# Patient Record
Sex: FEMALE | Race: WHITE | URBAN - METROPOLITAN AREA
[De-identification: names, ages, dates, MRNs, and addresses within clinical notes are randomized per-mention and may not be internally consistent; named-entity substitution may affect disease eponyms.]

---

## 2023-11-02 LAB — HBA1C MFR BLD HPLC: 5.3 %

## 2023-12-07 ENCOUNTER — OFFICE VISIT (OUTPATIENT)
Age: 33
End: 2023-12-07
Payer: COMMERCIAL

## 2023-12-07 VITALS
BODY MASS INDEX: 32.72 KG/M2 | HEIGHT: 65 IN | WEIGHT: 196.4 LBS | SYSTOLIC BLOOD PRESSURE: 123 MMHG | HEART RATE: 77 BPM | DIASTOLIC BLOOD PRESSURE: 83 MMHG

## 2023-12-07 DIAGNOSIS — E66.9 CLASS 1 OBESITY: ICD-10-CM

## 2023-12-07 DIAGNOSIS — E28.2 PCOS (POLYCYSTIC OVARIAN SYNDROME): ICD-10-CM

## 2023-12-07 DIAGNOSIS — E03.9 ACQUIRED HYPOTHYROIDISM: ICD-10-CM

## 2023-12-07 DIAGNOSIS — E03.9 ACQUIRED HYPOTHYROIDISM: Primary | ICD-10-CM

## 2023-12-07 DIAGNOSIS — E78.1 HIGH TRIGLYCERIDES: ICD-10-CM

## 2023-12-07 PROBLEM — E66.811 CLASS 1 OBESITY: Status: ACTIVE | Noted: 2023-12-07

## 2023-12-07 PROCEDURE — 99204 OFFICE O/P NEW MOD 45 MIN: CPT | Performed by: INTERNAL MEDICINE

## 2023-12-07 RX ORDER — METFORMIN HYDROCHLORIDE 500 MG/1
TABLET, EXTENDED RELEASE ORAL
Qty: 180 TABLET | Refills: 3 | Status: SHIPPED | OUTPATIENT
Start: 2023-12-07

## 2023-12-07 RX ORDER — LEVOTHYROXINE SODIUM 112 UG/1
112 TABLET ORAL DAILY
COMMUNITY
Start: 2023-11-26

## 2023-12-07 NOTE — PROGRESS NOTES
above         Patient Instructions   1) Sign up for JANZZ using the text link I sent to you and download the JANZZ radu from the radu store (I-phone) or Google play store (android) (make sure you allow locations and choose the [address] [place] [address] portal and choose to receive notifications) so you can communicate with me through the patient portal.  I will send you a message with your lab results. 2) TSH is a thyroid test.  Your level is 1.03 in 11/23 which is normal and at goal of 0.45-2.0. The TSH goes opposite of your thyroid dose and suggests your dose of levothyroxine is perfect so I will keep your dose the same for now. I may consider changing to brand medication in the future if your levels continue to fluctuate. I will check your TPO and thyroglobulin antibodies to screen for Hashimoto's thyroidits. The antibodies will not affect our treatment plan as I will adjust your dose over time based on your TSH and free T4 levels and symptoms and weight. 3) I will test your testosterone level to see if it's in the mid to high part of the normal range or elevated. I think you could have a condition called PCOS or polycystic ovarian syndrome where your body is resistant to insulin leading to high testosterone levels and this can cause irregular periods and weight gain and hair growth. It also increases the risk for diabetes and heart disease in the future. We will try to lower your testosterone with metformin to help treat this condition and prevent diabetes. Begin Metformin  mg with dinner or right after dinner and take for 1-2 weeks. If no GI side effects (nausea, diarrhea, belly pain), go up to 1000 mg = 2 tabs at dinner. You can split the dose to 1 tab twice daily if you would like. If at any point you cannot tolerate the dose, go back to one tab daily. This will be ready for  at the pharmacy today. Stop the insulin resistance supplement from [address].               Return in about 6

## 2023-12-07 NOTE — PATIENT INSTRUCTIONS
1) Sign up for Napartner using the text link I sent to you and download the Napartner radu from the radu store ([employer]-phone) or Google play store (android) (make sure you allow locations and choose the [employer] portal and choose to receive notifications) so you can communicate with me through the patient portal.  I will send you a message with your lab results. 2) TSH is a thyroid test.  Your level is 1.03 in 11/23 which is normal and at goal of 0.45-2.0. The TSH goes opposite of your thyroid dose and suggests your dose of levothyroxine is perfect so I will keep your dose the same for now. I may consider changing to brand medication in the future if your levels continue to fluctuate. I will check your TPO and thyroglobulin antibodies to screen for Hashimoto's thyroidits. The antibodies will not affect our treatment plan as I will adjust your dose over time based on your TSH and free T4 levels and symptoms and weight. 3) I will test your testosterone level to see if it's in the mid to high part of the normal range or elevated. I think you could have a condition called PCOS or polycystic ovarian syndrome where your body is resistant to insulin leading to high testosterone levels and this can cause irregular periods and weight gain and hair growth. It also increases the risk for diabetes and heart disease in the future. We will try to lower your testosterone with metformin to help treat this condition and prevent diabetes. Begin Metformin  mg with dinner or right after dinner and take for 1-2 weeks. If no GI side effects (nausea, diarrhea, belly pain), go up to 1000 mg = 2 tabs at dinner. You can split the dose to 1 tab twice daily if you would like. If at any point you cannot tolerate the dose, go back to one tab daily. This will be ready for  at the pharmacy today. Stop the insulin resistance supplement from [employer]

## 2023-12-08 LAB
T4 FREE SERPL-MCNC: 1 NG/DL (ref 0.8–1.5)
TSH SERPL DL<=0.05 MIU/L-ACNC: 1.87 UIU/ML (ref 0.36–3.74)

## 2023-12-09 LAB — T3 SERPL-MCNC: 106 NG/DL (ref 71–180)

## 2023-12-10 LAB
THYROGLOB AB SERPL-ACNC: 2.9 IU/ML (ref 0–0.9)
THYROPEROXIDASE AB SERPL-ACNC: 14 IU/ML (ref 0–34)

## 2023-12-12 LAB — TESTOST SERPL-MCNC: 21.7 NG/DL (ref 10–55)

## 2024-03-27 ENCOUNTER — OFFICE VISIT (OUTPATIENT)
Facility: CLINIC | Age: 34
End: 2024-03-27
Payer: COMMERCIAL

## 2024-03-27 VITALS
RESPIRATION RATE: 20 BRPM | HEART RATE: 78 BPM | DIASTOLIC BLOOD PRESSURE: 77 MMHG | BODY MASS INDEX: 31.71 KG/M2 | WEIGHT: 202 LBS | TEMPERATURE: 98.1 F | HEIGHT: 67 IN | SYSTOLIC BLOOD PRESSURE: 125 MMHG | OXYGEN SATURATION: 98 %

## 2024-03-27 DIAGNOSIS — R11.0 CHRONIC NAUSEA: ICD-10-CM

## 2024-03-27 DIAGNOSIS — E03.9 ACQUIRED HYPOTHYROIDISM: ICD-10-CM

## 2024-03-27 DIAGNOSIS — F51.01 PRIMARY INSOMNIA: Primary | ICD-10-CM

## 2024-03-27 DIAGNOSIS — E28.2 PCOS (POLYCYSTIC OVARIAN SYNDROME): ICD-10-CM

## 2024-03-27 LAB
ALBUMIN SERPL-MCNC: 3.8 G/DL (ref 3.5–5)
ALBUMIN/GLOB SERPL: 1.3 (ref 1.1–2.2)
ALP SERPL-CCNC: 109 U/L (ref 45–117)
ALT SERPL-CCNC: 36 U/L (ref 12–78)
ANION GAP SERPL CALC-SCNC: 4 MMOL/L (ref 5–15)
AST SERPL-CCNC: 16 U/L (ref 15–37)
BILIRUB SERPL-MCNC: 0.3 MG/DL (ref 0.2–1)
BUN SERPL-MCNC: 13 MG/DL (ref 6–20)
BUN/CREAT SERPL: 22 (ref 12–20)
CALCIUM SERPL-MCNC: 9.1 MG/DL (ref 8.5–10.1)
CHLORIDE SERPL-SCNC: 107 MMOL/L (ref 97–108)
CO2 SERPL-SCNC: 27 MMOL/L (ref 21–32)
CREAT SERPL-MCNC: 0.58 MG/DL (ref 0.55–1.02)
EST. AVERAGE GLUCOSE BLD GHB EST-MCNC: 108 MG/DL
GLOBULIN SER CALC-MCNC: 3 G/DL (ref 2–4)
GLUCOSE SERPL-MCNC: 106 MG/DL (ref 65–100)
HBA1C MFR BLD: 5.4 % (ref 4–5.6)
POTASSIUM SERPL-SCNC: 4.9 MMOL/L (ref 3.5–5.1)
PROT SERPL-MCNC: 6.8 G/DL (ref 6.4–8.2)
SODIUM SERPL-SCNC: 138 MMOL/L (ref 136–145)

## 2024-03-27 PROCEDURE — 99204 OFFICE O/P NEW MOD 45 MIN: CPT | Performed by: FAMILY MEDICINE

## 2024-03-27 RX ORDER — QUETIAPINE FUMARATE 25 MG/1
25 TABLET, FILM COATED ORAL
Qty: 30 TABLET | Refills: 1 | Status: SHIPPED | OUTPATIENT
Start: 2024-03-27

## 2024-03-27 SDOH — ECONOMIC STABILITY: FOOD INSECURITY: WITHIN THE PAST 12 MONTHS, YOU WORRIED THAT YOUR FOOD WOULD RUN OUT BEFORE YOU GOT MONEY TO BUY MORE.: NEVER TRUE

## 2024-03-27 SDOH — ECONOMIC STABILITY: FOOD INSECURITY: WITHIN THE PAST 12 MONTHS, THE FOOD YOU BOUGHT JUST DIDN'T LAST AND YOU DIDN'T HAVE MONEY TO GET MORE.: NEVER TRUE

## 2024-03-27 SDOH — ECONOMIC STABILITY: HOUSING INSECURITY
IN THE LAST 12 MONTHS, WAS THERE A TIME WHEN YOU DID NOT HAVE A STEADY PLACE TO SLEEP OR SLEPT IN A SHELTER (INCLUDING NOW)?: NO

## 2024-03-27 SDOH — ECONOMIC STABILITY: INCOME INSECURITY: HOW HARD IS IT FOR YOU TO PAY FOR THE VERY BASICS LIKE FOOD, HOUSING, MEDICAL CARE, AND HEATING?: NOT HARD AT ALL

## 2024-03-27 ASSESSMENT — PATIENT HEALTH QUESTIONNAIRE - PHQ9
SUM OF ALL RESPONSES TO PHQ QUESTIONS 1-9: 0
1. LITTLE INTEREST OR PLEASURE IN DOING THINGS: NOT AT ALL
SUM OF ALL RESPONSES TO PHQ QUESTIONS 1-9: 0
2. FEELING DOWN, DEPRESSED OR HOPELESS: NOT AT ALL
SUM OF ALL RESPONSES TO PHQ QUESTIONS 1-9: 0
SUM OF ALL RESPONSES TO PHQ9 QUESTIONS 1 & 2: 0
SUM OF ALL RESPONSES TO PHQ QUESTIONS 1-9: 0

## 2024-03-27 NOTE — PROGRESS NOTES
Chief Complaint   Patient presents with    Establish Care    Abdominal Pain    Insomnia     \"Have you been to the ER, urgent care clinic since your last visit?  Hospitalized since your last visit?\"    YES - When: approximately 4 days ago.  Where and Why: [place] [facility] for something in ear.    “Have you seen or consulted any other health care providers outside of [facility] since your last visit?”    NO     “Have you had a pap smear?”    NO    No cervical cancer screening on file             Click Here for Release of Records Request  
in [place] alone.  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  Likes to go to the gym.  Originally from [place] and moved in August 2022 for cost of living and [place]     Social Determinants of Health     Financial Resource Strain: Low Risk  (3/27/2024)    Overall Financial Resource Strain (CARDIA)     Difficulty of Paying Living Expenses: Not hard at all   Food Insecurity: No Food Insecurity (3/27/2024)    Hunger Vital Sign     Worried About Running Out of Food in the Last Year: Never true     Ran Out of Food in the Last Year: Never true   Transportation Needs: Unknown (3/27/2024)    PRAPARE - Transportation     Lack of Transportation (Non-Medical): No   Housing Stability: Unknown (3/27/2024)    Housing Stability Vital Sign     Unstable Housing in the Last Year: No        Review of Systems - Pertinent items are noted in HPI.    Objective:     Vitals:    03/27/24 1131   BP: 125/77   Site: Left Upper Arm   Position: Sitting   Pulse: 78   Resp: 20   Temp: 98.1 °F (36.7 °C)   TempSrc: Oral   SpO2: 98%   Weight: 91.6 kg (202 lb)   Height: 1.702 m (5' 7\")       Physical Examination: General Appearance:  awake, alert, oriented, in no acute distress and overweight  Head/face:  NCAT  Lungs:  Normal expansion.  Clear to auscultation.  No rales, rhonchi, or wheezing.  Heart:  Heart sounds are normal.  Regular rate and rhythm without murmur, gallop or rub.  Abdomen:  Auscultation: Abnormal bowel sounds: diminished  Palpation: No masses, tenderness or organomegally.  Hernias:  none  Psych: Appropriate mood and affect    Assessment/ Plan:   1. Primary insomnia  Comments:  Trial of Seroquel given; increase to 50 mg if needed.  If still not working, will prescribe Belsomra.  Limit OTC sleep aids as may be worsening sleep eating.  Orders:  -     QUEtiapine (SEROQUEL) 25 MG tablet; Take 1 tablet by mouth nightly, Disp-30 tablet, R-1Normal  2. Chronic nausea  Comments:  History of overuse of

## 2024-04-09 DIAGNOSIS — F51.01 PRIMARY INSOMNIA: Primary | ICD-10-CM

## 2024-04-09 RX ORDER — SUVOREXANT 10 MG/1
10 TABLET, FILM COATED ORAL
Qty: 30 TABLET | Refills: 2 | Status: SHIPPED | OUTPATIENT
Start: 2024-04-09 | End: 2024-07-08

## 2024-04-18 DIAGNOSIS — F51.01 PRIMARY INSOMNIA: ICD-10-CM

## 2024-04-18 RX ORDER — QUETIAPINE FUMARATE 25 MG/1
25 TABLET, FILM COATED ORAL NIGHTLY
Qty: 90 TABLET | Refills: 1 | Status: SHIPPED | OUTPATIENT
Start: 2024-04-18

## 2024-05-24 DIAGNOSIS — E03.9 ACQUIRED HYPOTHYROIDISM: ICD-10-CM

## 2024-05-24 DIAGNOSIS — E78.1 HIGH TRIGLYCERIDES: ICD-10-CM

## 2024-05-24 DIAGNOSIS — E66.9 CLASS 1 OBESITY: ICD-10-CM

## 2024-05-24 DIAGNOSIS — E28.2 PCOS (POLYCYSTIC OVARIAN SYNDROME): ICD-10-CM

## 2024-06-07 ENCOUNTER — OFFICE VISIT (OUTPATIENT)
Age: 34
End: 2024-06-07
Payer: COMMERCIAL

## 2024-06-07 VITALS
DIASTOLIC BLOOD PRESSURE: 82 MMHG | HEIGHT: 67 IN | HEART RATE: 87 BPM | WEIGHT: 209.6 LBS | BODY MASS INDEX: 32.9 KG/M2 | SYSTOLIC BLOOD PRESSURE: 133 MMHG

## 2024-06-07 DIAGNOSIS — E06.3 HYPOTHYROIDISM DUE TO HASHIMOTO'S THYROIDITIS: Primary | ICD-10-CM

## 2024-06-07 DIAGNOSIS — E03.8 HYPOTHYROIDISM DUE TO HASHIMOTO'S THYROIDITIS: Primary | ICD-10-CM

## 2024-06-07 DIAGNOSIS — E03.8 HYPOTHYROIDISM DUE TO HASHIMOTO'S THYROIDITIS: ICD-10-CM

## 2024-06-07 DIAGNOSIS — E28.2 PCOS (POLYCYSTIC OVARIAN SYNDROME): ICD-10-CM

## 2024-06-07 DIAGNOSIS — E78.1 HIGH TRIGLYCERIDES: ICD-10-CM

## 2024-06-07 DIAGNOSIS — E06.3 HYPOTHYROIDISM DUE TO HASHIMOTO'S THYROIDITIS: ICD-10-CM

## 2024-06-07 DIAGNOSIS — E66.9 CLASS 1 OBESITY: ICD-10-CM

## 2024-06-07 LAB
ANION GAP SERPL CALC-SCNC: 6 MMOL/L (ref 5–15)
BUN SERPL-MCNC: 10 MG/DL (ref 6–20)
BUN/CREAT SERPL: 17 (ref 12–20)
CALCIUM SERPL-MCNC: 9.3 MG/DL (ref 8.5–10.1)
CHLORIDE SERPL-SCNC: 108 MMOL/L (ref 97–108)
CHOLEST SERPL-MCNC: 163 MG/DL
CO2 SERPL-SCNC: 27 MMOL/L (ref 21–32)
CREAT SERPL-MCNC: 0.58 MG/DL (ref 0.55–1.02)
GLUCOSE SERPL-MCNC: 94 MG/DL (ref 65–100)
HDLC SERPL-MCNC: 45 MG/DL
HDLC SERPL: 3.6 (ref 0–5)
LDLC SERPL CALC-MCNC: 89 MG/DL (ref 0–100)
POTASSIUM SERPL-SCNC: 4.5 MMOL/L (ref 3.5–5.1)
SODIUM SERPL-SCNC: 141 MMOL/L (ref 136–145)
T4 FREE SERPL-MCNC: 1.1 NG/DL (ref 0.8–1.5)
TRIGL SERPL-MCNC: 145 MG/DL
TSH SERPL DL<=0.05 MIU/L-ACNC: 1.09 UIU/ML (ref 0.36–3.74)
VLDLC SERPL CALC-MCNC: 29 MG/DL

## 2024-06-07 PROCEDURE — 99214 OFFICE O/P EST MOD 30 MIN: CPT | Performed by: INTERNAL MEDICINE

## 2024-06-07 NOTE — PATIENT INSTRUCTIONS
1)  I will send you a message through [employer] with your lab results.    2) It's fine to move the levothyroxine to the middle of the night at least 2 hours after any food at bedtime and at least 30 minutes before any food and coffee in the morning to ensure proper absorption.    3) You can try berberine 500 mg tabs (look in the supplement section of local pharmacy or [employer] or vitamin shop or buy online from [employer]) and take 1 tab with dinner or right after dinner.  This has properties similar to metformin and sometimes has less GI side effects than metformin.  If tolerating after 2 weeks, try increasing to 2 tabs with dinner or you can try splitting to 1 tab with breakfast and dinner.    4) The key to losing weight is less circulating insulin as the more insulin that circulates in your blood, the harder it is to burn belly fat.  Every time you eat or drink anything with sugar, your pancreas produces more insulin and this will lead to more difficulty losing weight so the more time you spend in a fasting state, the better you will be able to lose weight and when you do eat, you want to limit your carbohydrate intake as best as possible.    Try intermittent fasting where you eat a meal at noon and another one at 6pm and then don't eat any food for 18 hours (or pick another 6 hour window that works for you).  If you get hungry instead of eating, try drinking 8-12 oz of water as often your brain cannot tell the difference between hunger and thirst.    If you absolutely can't resist your hunger, then only have protein like a slice of cheese or deli meat or handful of almonds or 1 teaspoon of peanut butter or a hard boiled egg or try sugar free jello or pudding or raw veggies.    Try not to eat more than 45 grams of carbs for your 2 meals (1 palm/fist sized serving = 30 grams; carbs are potatoes, rice, pasta, bread, fruit, corn, peas, cereal, desserts).  If you really want to lose weight, try not to eat more than 30 grams at

## 2024-06-07 NOTE — PROGRESS NOTES
Chief Complaint   Patient presents with    Thyroid Problem     PCP and pharmacy confirmed     History of Present Illness: Nel Nguyen is a 33 y.o. female here for follow up of thyroid.  Weight up 13 lbs since last visit in 12/23.  Has been getting the levothyroxine 112 mcg everyday at least 30 min before food but doesn't always wait 30 min before coffee.  Overall energy is pretty good as long as she gets 2 cups of coffee.  Could not tolerate the metformin due to stomach pain and diarrhea so only gets 1 tab 3 times a week at most.  Did try the carnivore diet for about a month but gained weight on this so stopped.  Willing to try berberine.  She will be moving to [place], [place] in 1/25 but hopes to keep coming to see me.  Willing to try intermittent fasting.      Current Outpatient Medications   Medication Sig    levothyroxine (SYNTHROID) 112 MCG tablet Take 1 tablet by mouth Daily    Multiple Vitamin (MULTIVITAMIN ADULT PO) Take by mouth daily    Probiotic Product (PROBIOTIC DAILY PO) Take by mouth daily    metFORMIN (GLUCOPHAGE-XR) 500 MG extended release tablet Take 1 tab with dinner for 1-2 weeks and then 2 tabs daily (Patient taking differently: Take 1 tab with dinner for 1-2 weeks and then 2 tabs daily not taking daily)     No current facility-administered medications for this visit.     Allergies   Allergen Reactions    Metformin And Related Diarrhea     With 500 mg of ER metformin per day       Review of Systems: PER HPI    Physical Examination:  Blood pressure 133/82, pulse 87, height 1.702 m (5' 7\"), weight 95.1 kg (209 lb 9.6 oz).  General: pleasant, no distress, good eye contact   Neck: no carotid bruits  Cardiovascular: regular, normal rate, nl s1 and s2, no m/r/g   Respiratory: clear to auscultation bilaterally   Integumentary: skin is normal, no edema  Neurological: reflexes 2+ at biceps, no tremors  Psychiatric: normal mood and affect    Data Reviewed:   - none new for review    Assessment/Plan:     1.

## 2024-06-09 RX ORDER — LEVOTHYROXINE SODIUM 112 UG/1
112 TABLET ORAL DAILY
Qty: 90 TABLET | Refills: 3 | Status: SHIPPED | OUTPATIENT
Start: 2024-06-09

## 2024-06-12 LAB — TESTOST SERPL-MCNC: 24.9 NG/DL (ref 10–55)

## 2024-10-10 RX ORDER — METFORMIN HCL 500 MG
TABLET, EXTENDED RELEASE 24 HR ORAL
Qty: 60 TABLET | Refills: 11 | Status: SHIPPED | OUTPATIENT
Start: 2024-10-10

## 2024-10-14 ENCOUNTER — TELEPHONE (OUTPATIENT)
Age: 34
End: 2024-10-14

## 2024-10-14 NOTE — TELEPHONE ENCOUNTER
Please call pt to let her know she has an unread message in [employer]:    Restart Metformin  mg with dinner or right after dinner and take for 1-2 weeks. If no GI side effects (nausea, diarrhea, belly pain), go up to 1000 mg = 2 tabs at dinner. You can split the dose to 1 tab twice daily if you would like. If at any point you cannot tolerate the dose, go back to one tab daily. This will be ready for  at the pharmacy today.

## 2024-10-15 NOTE — TELEPHONE ENCOUNTER
LVM informing pt Dr. Zamora has sent you a [employer] message that you have not yet read. Please read this as soon as possible and if you are unable to get into [employer] to read the message then please call me back and I'm happy to read the message to you.

## 2024-11-25 DIAGNOSIS — E06.3 HYPOTHYROIDISM DUE TO HASHIMOTO'S THYROIDITIS: ICD-10-CM

## 2024-11-25 DIAGNOSIS — E28.2 PCOS (POLYCYSTIC OVARIAN SYNDROME): ICD-10-CM

## 2024-11-25 DIAGNOSIS — E78.1 HIGH TRIGLYCERIDES: ICD-10-CM

## 2024-11-25 DIAGNOSIS — E66.811 CLASS 1 OBESITY: ICD-10-CM

## 2024-12-08 LAB
BUN SERPL-MCNC: 10 MG/DL (ref 6–20)
BUN/CREAT SERPL: 17 (ref 9–23)
CALCIUM SERPL-MCNC: 9.1 MG/DL (ref 8.7–10.2)
CHLORIDE SERPL-SCNC: 99 MMOL/L (ref 96–106)
CHOLEST SERPL-MCNC: 202 MG/DL (ref 100–199)
CO2 SERPL-SCNC: 23 MMOL/L (ref 20–29)
CREAT SERPL-MCNC: 0.58 MG/DL (ref 0.57–1)
EGFRCR SERPLBLD CKD-EPI 2021: 122 ML/MIN/1.73
GLUCOSE SERPL-MCNC: 97 MG/DL (ref 70–99)
HDLC SERPL-MCNC: 54 MG/DL
IMP & REVIEW OF LAB RESULTS: NORMAL
LDLC SERPL CALC-MCNC: 124 MG/DL (ref 0–99)
POTASSIUM SERPL-SCNC: 4.6 MMOL/L (ref 3.5–5.2)
SODIUM SERPL-SCNC: 138 MMOL/L (ref 134–144)
T4 FREE SERPL-MCNC: 1.05 NG/DL (ref 0.82–1.77)
TRIGL SERPL-MCNC: 138 MG/DL (ref 0–149)
TSH SERPL DL<=0.005 MIU/L-ACNC: 3.42 UIU/ML (ref 0.45–4.5)
VLDLC SERPL CALC-MCNC: 24 MG/DL (ref 5–40)

## 2024-12-10 LAB — TESTOST SERPL-MCNC: 27.5 NG/DL (ref 10–55)

## 2025-01-20 ENCOUNTER — TELEPHONE (OUTPATIENT)
Age: 35
End: 2025-01-20

## 2025-01-21 NOTE — TELEPHONE ENCOUNTER
----- Message from Benita LOYA sent at 1/14/2025 12:19 PM EST -----  Regarding: RE: rescheduling appt  I still have not got a response to the voicemail I left, I will send out another my chart message to see if 1/21 9:30 appt works  ----- Message -----  From: Rhett Zamora MD  Sent: 1/14/2025   9:32 AM EST  To: Benita Hughes  Subject: RE: rescheduling appt                            I can do today at 12:10pm in person or virtually or Tues 1/21 at 9:30am in person or virtually.  Thanks!  ----- Message -----  From: Benita Hughes  Sent: 1/13/2025   4:26 PM EST  To: Rhett Zamora MD  Subject: RE: rescheduling appt                            okay thanks  ----- Message -----  From: Rhett Zamora MD  Sent: 1/13/2025   4:13 PM EST  To: Benita Hughes  Subject: RE: rescheduling appt                            I'll check and get back to you.  ----- Message -----  From: Benita Hughes  Sent: 1/13/2025   3:55 PM EST  To: Rhett Zamora MD  Subject: RE: rescheduling appt                            Is there another date and time you would like for us to offer the patient? since those 2 options have been filled  ----- Message -----  From: Rhett Zamora MD  Sent: 1/10/2025   3:21 PM EST  To: #  Subject: rescheduling appt                                Please see if she can take the visit on Mon at 8:50 virtually or Tues at 10:30 virtually or in person (provided we're back in the office).  Thanks!

## 2025-02-27 ENCOUNTER — APPOINTMENT (OUTPATIENT)
Facility: HOSPITAL | Age: 35
End: 2025-02-27
Payer: COMMERCIAL

## 2025-02-27 ENCOUNTER — HOSPITAL ENCOUNTER (EMERGENCY)
Facility: HOSPITAL | Age: 35
Discharge: HOME | End: 2025-02-27
Attending: EMERGENCY MEDICINE
Payer: COMMERCIAL

## 2025-02-27 VITALS
WEIGHT: 235 LBS | OXYGEN SATURATION: 100 % | HEIGHT: 65 IN | BODY MASS INDEX: 39.15 KG/M2 | RESPIRATION RATE: 18 BRPM | HEART RATE: 90 BPM | SYSTOLIC BLOOD PRESSURE: 128 MMHG | DIASTOLIC BLOOD PRESSURE: 62 MMHG | TEMPERATURE: 98.2 F

## 2025-02-27 DIAGNOSIS — S93.401A SPRAIN OF RIGHT ANKLE, UNSPECIFIED LIGAMENT, INITIAL ENCOUNTER: Primary | ICD-10-CM

## 2025-02-27 PROCEDURE — 99283 EMERGENCY DEPT VISIT LOW MDM: CPT

## 2025-02-27 PROCEDURE — 73630 X-RAY EXAM OF FOOT: CPT

## 2025-02-27 PROCEDURE — 6370000000 HC RX 637 (ALT 250 FOR IP): Performed by: EMERGENCY MEDICINE

## 2025-02-27 PROCEDURE — 73610 X-RAY EXAM OF ANKLE: CPT

## 2025-02-27 RX ORDER — IBUPROFEN 600 MG/1
600 TABLET, FILM COATED ORAL EVERY 8 HOURS PRN
Qty: 30 TABLET | Refills: 0 | Status: SHIPPED | OUTPATIENT
Start: 2025-02-27 | End: 2025-02-28

## 2025-02-27 RX ORDER — HYDROCODONE BITARTRATE AND ACETAMINOPHEN 5; 325 MG/1; MG/1
1 TABLET ORAL
Status: COMPLETED | OUTPATIENT
Start: 2025-02-27 | End: 2025-02-27

## 2025-02-27 RX ORDER — ACETAMINOPHEN 500 MG
1000 TABLET ORAL 3 TIMES DAILY PRN
Qty: 30 TABLET | Refills: 1 | Status: SHIPPED | OUTPATIENT
Start: 2025-02-27 | End: 2025-02-28

## 2025-02-27 RX ORDER — KETOROLAC TROMETHAMINE 30 MG/ML
30 INJECTION, SOLUTION INTRAMUSCULAR; INTRAVENOUS
Status: DISCONTINUED | OUTPATIENT
Start: 2025-02-27 | End: 2025-02-27 | Stop reason: HOSPADM

## 2025-02-27 RX ADMIN — HYDROCODONE BITARTRATE AND ACETAMINOPHEN 1 TABLET: 5; 325 TABLET ORAL at 13:11

## 2025-02-27 ASSESSMENT — PAIN DESCRIPTION - DESCRIPTORS
DESCRIPTORS: ACHING;THROBBING
DESCRIPTORS: PATIENT UNABLE TO DESCRIBE

## 2025-02-27 ASSESSMENT — PAIN SCALES - GENERAL
PAINLEVEL_OUTOF10: 10
PAINLEVEL_OUTOF10: 10

## 2025-02-27 ASSESSMENT — LIFESTYLE VARIABLES
HOW MANY STANDARD DRINKS CONTAINING ALCOHOL DO YOU HAVE ON A TYPICAL DAY: PATIENT DOES NOT DRINK
HOW OFTEN DO YOU HAVE A DRINK CONTAINING ALCOHOL: NEVER

## 2025-02-27 ASSESSMENT — ENCOUNTER SYMPTOMS
NAUSEA: 0
ABDOMINAL PAIN: 0
ANAL BLEEDING: 0
VOMITING: 0
COUGH: 0
BLOOD IN STOOL: 0
SHORTNESS OF BREATH: 0
DIARRHEA: 0
ABDOMINAL DISTENTION: 0

## 2025-02-27 ASSESSMENT — PAIN DESCRIPTION - LOCATION
LOCATION: ANKLE
LOCATION: ANKLE

## 2025-02-27 ASSESSMENT — PAIN DESCRIPTION - ORIENTATION
ORIENTATION: RIGHT
ORIENTATION: RIGHT

## 2025-02-27 NOTE — ED TRIAGE NOTES
Patient arrives to ed via EMS with c/o right ankle pain after falling in the yard. EMS noted PMS intact as well as deformity to right ankle.

## 2025-02-27 NOTE — ED PROVIDER NOTES
[facility] EMERGENCY DEPARTMENT  EMERGENCY DEPARTMENT ENCOUNTER      Pt Name: Nel Nguyen  MRN: [id number]  Birthdate 1990  Date of evaluation: 2/27/2025  Provider: Emory Barron MD    CHIEF COMPLAINT       Chief Complaint   Patient presents with    Ankle Pain         HISTORY OF PRESENT ILLNESS   (Location/Symptom, Timing/Onset, Context/Setting, Quality, Duration, Modifying Factors, Severity)  Note limiting factors.   34F w/ hx PCOS and HLD p/w 1hr right ankle pain. Pt reports right ankle and foot pain and swelling after tripped in a ditch. States that she tried to walk and the tripped again. Denies hitting her head or LOC. No knee or hip pain. Now c/o right lateral ankle pain and swelling worse w/ any movement. No ext weakness/numbness. Left leg unaffected. Hasn't taken anything for pain.            Review of External Medical Records:     Nursing Notes were reviewed.    REVIEW OF SYSTEMS    (2-9 systems for level 4, 10 or more for level 5)     Review of Systems   Constitutional:  Negative for diaphoresis and fever.   HENT:  Negative for nosebleeds.    Eyes:  Negative for visual disturbance.   Respiratory:  Negative for cough and shortness of breath.    Cardiovascular:  Negative for chest pain, palpitations and leg swelling.   Gastrointestinal:  Negative for abdominal distention, abdominal pain, anal bleeding, blood in stool, diarrhea, nausea and vomiting.   Endocrine: Negative for polyuria.   Genitourinary:  Negative for difficulty urinating, dysuria, frequency and hematuria.   Musculoskeletal:  Positive for arthralgias and joint swelling.   Skin:  Negative for wound.   Allergic/Immunologic: Negative for immunocompromised state.   Neurological:  Negative for seizures and syncope.   Hematological:  Does not bruise/bleed easily.   Psychiatric/Behavioral:  Negative for confusion.        Except as noted above the remainder of the review of systems was reviewed and negative.       PAST MEDICAL HISTORY     Past

## 2025-02-28 ENCOUNTER — HOSPITAL ENCOUNTER (EMERGENCY)
Facility: HOSPITAL | Age: 35
Discharge: HOME | End: 2025-02-28
Attending: EMERGENCY MEDICINE
Payer: COMMERCIAL

## 2025-02-28 VITALS
HEIGHT: 65 IN | DIASTOLIC BLOOD PRESSURE: 95 MMHG | WEIGHT: 235.01 LBS | RESPIRATION RATE: 16 BRPM | SYSTOLIC BLOOD PRESSURE: 146 MMHG | BODY MASS INDEX: 39.16 KG/M2 | OXYGEN SATURATION: 98 % | HEART RATE: 88 BPM | TEMPERATURE: 98.3 F

## 2025-02-28 DIAGNOSIS — S93.401A SPRAIN OF RIGHT ANKLE, UNSPECIFIED LIGAMENT, INITIAL ENCOUNTER: Primary | ICD-10-CM

## 2025-02-28 PROCEDURE — 99283 EMERGENCY DEPT VISIT LOW MDM: CPT

## 2025-02-28 PROCEDURE — 6370000000 HC RX 637 (ALT 250 FOR IP): Performed by: EMERGENCY MEDICINE

## 2025-02-28 RX ORDER — IBUPROFEN 800 MG/1
800 TABLET, FILM COATED ORAL EVERY 6 HOURS PRN
Qty: 30 TABLET | Refills: 1 | Status: SHIPPED | OUTPATIENT
Start: 2025-02-28

## 2025-02-28 RX ORDER — ACETAMINOPHEN 500 MG
1000 TABLET ORAL 3 TIMES DAILY
Qty: 120 TABLET | Refills: 3 | Status: SHIPPED | OUTPATIENT
Start: 2025-02-28

## 2025-02-28 RX ORDER — OXYCODONE HYDROCHLORIDE 5 MG/1
5 TABLET ORAL EVERY 4 HOURS PRN
Qty: 10 TABLET | Refills: 0 | Status: SHIPPED | OUTPATIENT
Start: 2025-02-28 | End: 2025-03-03

## 2025-02-28 RX ORDER — OXYCODONE AND ACETAMINOPHEN 5; 325 MG/1; MG/1
2 TABLET ORAL ONCE
Status: COMPLETED | OUTPATIENT
Start: 2025-02-28 | End: 2025-02-28

## 2025-02-28 RX ADMIN — OXYCODONE AND ACETAMINOPHEN 2 TABLET: 5; 325 TABLET ORAL at 04:32

## 2025-02-28 ASSESSMENT — PAIN DESCRIPTION - LOCATION: LOCATION: ANKLE

## 2025-02-28 ASSESSMENT — PAIN - FUNCTIONAL ASSESSMENT: PAIN_FUNCTIONAL_ASSESSMENT: 0-10

## 2025-02-28 NOTE — ED TRIAGE NOTES
Pt presents to the ED with a walking boot on her RLE and brought back into room via wheelchair w/ c/o uncontrollable R ankle pain. Was informed to take tylenol and ibuprofen for the pain but states she has maxed out her daily dose and is not getting relief.

## 2025-03-01 NOTE — ED PROVIDER NOTES
[facility] EMERGENCY DEPARTMENT  EMERGENCY DEPARTMENT ENCOUNTER      Pt Name: Nel Nguyen  MRN: [id number]  Birthdate 1990  Date of evaluation: 2/28/2025  Provider: Ren Garcia MD    CHIEF COMPLAINT       Chief Complaint   Patient presents with    Ankle Pain       EMERGENCY DEPARTMENT COURSE and DIFFERENTIAL DIAGNOSIS/MDM:   Medical Decision Making  34-year-old female presenting ER with continued right ankle pain.  Patient was seen in the ER yesterday and diagnosed with ankle sprain.  Patient had x-rays of ankle and foot with no acute abnormalities and was given prescriptions for Tylenol Motrin and Ortho walking boot.  Patient reports that she did not know that she had prescriptions and had taken for 100 mg Motrin M365 milligrams of Tylenol without improvement.  Has been icing and keeping it elevated.  Denies any pain in the proximal lower leg.  Worsen pain to palpation and ambulation.  This no numbness ting or weakness.  No previous injuries to this ankle.  On exam patient has moderate swelling of the right lateral malleolus.  Normal pulses.  No pain at the base of the fifth metatarsal or proximal lower leg pain.  No crepitus.  Patient does have some ecchymosis.  I have personally reviewed imaging notes performed earlier with no signs of any fractures dislocations and was read by the radiologist.  Soft compartments.  I discussed further pain management and follow-up with orthopedics.  Patient stable for discharge    Amount and/or Complexity of Data Reviewed  External Data Reviewed: radiology.     Details: xrays    Risk  OTC drugs.  Prescription drug management.            REASSESSMENT          HISTORY OF PRESENT ILLNESS    34-year-old female presenting ER with continued right ankle pain.          Nursing Notes were reviewed.    REVIEW OF SYSTEMS       Review of Systems      PAST MEDICAL HISTORY     Past Medical History:   Diagnosis Date    Acquired hypothyroidism 2019    Insulin resistance          DISPOSITION/PLAN   DISPOSITION Decision To Discharge 02/28/2025 04:32:09 AM      PATIENT REFERRED TO:  [facility] [place] [id number] [facility]  [address]  [place] [id number] [phone]  Schedule an appointment as soon as possible for a visit         DISCHARGE MEDICATIONS:  Discharge Medication List as of 2/28/2025  4:35 AM        START taking these medications    Details   oxyCODONE (ROXICODONE) 5 MG immediate release tablet Take 1 tablet by mouth every 4 hours as needed for Pain for up to 3 days. Max Daily Amount: 30 mg, Disp-10 tablet, R-0Normal               (Please note that portions of this note were completed with a voice recognition program.  Efforts were made to edit the dictations but occasionally words are mis-transcribed.)    Ren Garcia MD (electronically signed)  Emergency Attending Physician            Ren Garcia MD  02/28/25 2025

## 2025-07-17 RX ORDER — LEVOTHYROXINE SODIUM 112 UG/1
112 TABLET ORAL DAILY
Qty: 90 TABLET | Refills: 3 | Status: SHIPPED | OUTPATIENT
Start: 2025-07-17